# Patient Record
Sex: MALE | Race: BLACK OR AFRICAN AMERICAN | NOT HISPANIC OR LATINO | URBAN - METROPOLITAN AREA
[De-identification: names, ages, dates, MRNs, and addresses within clinical notes are randomized per-mention and may not be internally consistent; named-entity substitution may affect disease eponyms.]

---

## 2021-09-07 ENCOUNTER — HOSPITAL ENCOUNTER (EMERGENCY)
Facility: HOSPITAL | Age: 46
Discharge: HOME | End: 2021-09-07
Attending: EMERGENCY MEDICINE
Payer: MEDICAID

## 2021-09-07 VITALS
DIASTOLIC BLOOD PRESSURE: 83 MMHG | HEIGHT: 68 IN | WEIGHT: 290 LBS | TEMPERATURE: 100 F | BODY MASS INDEX: 43.95 KG/M2 | OXYGEN SATURATION: 96 % | SYSTOLIC BLOOD PRESSURE: 152 MMHG | RESPIRATION RATE: 18 BRPM | HEART RATE: 81 BPM

## 2021-09-07 DIAGNOSIS — U07.1 COVID-19 VIRUS INFECTION: Primary | ICD-10-CM

## 2021-09-07 DIAGNOSIS — J40 BRONCHITIS: ICD-10-CM

## 2021-09-07 DIAGNOSIS — U07.1 COVID-19 VIRUS DETECTED: ICD-10-CM

## 2021-09-07 DIAGNOSIS — R05.9 COUGH: ICD-10-CM

## 2021-09-07 LAB
CTP QC/QA: YES
SARS-COV-2 RDRP RESP QL NAA+PROBE: POSITIVE

## 2021-09-07 PROCEDURE — 99283 PR EMERGENCY DEPT VISIT,LEVEL III: ICD-10-PCS | Mod: CR,CS,, | Performed by: EMERGENCY MEDICINE

## 2021-09-07 PROCEDURE — 99283 EMERGENCY DEPT VISIT LOW MDM: CPT | Mod: 25

## 2021-09-07 PROCEDURE — U0002 COVID-19 LAB TEST NON-CDC: HCPCS | Performed by: EMERGENCY MEDICINE

## 2021-09-07 PROCEDURE — 99283 EMERGENCY DEPT VISIT LOW MDM: CPT | Mod: CR,CS,, | Performed by: EMERGENCY MEDICINE

## 2021-09-07 RX ORDER — AZITHROMYCIN 250 MG/1
250 TABLET, FILM COATED ORAL DAILY
Qty: 6 TABLET | Refills: 0 | Status: SHIPPED | OUTPATIENT
Start: 2021-09-07

## 2023-06-29 DIAGNOSIS — M65.9 SYNOVITIS AND TENOSYNOVITIS, UNSPECIFIED: Primary | ICD-10-CM

## 2023-07-28 ENCOUNTER — CLINICAL SUPPORT (OUTPATIENT)
Dept: REHABILITATION | Facility: HOSPITAL | Age: 48
End: 2023-07-28
Payer: MEDICAID

## 2023-07-28 DIAGNOSIS — R29.898 DECREASED GRIP STRENGTH OF RIGHT HAND: ICD-10-CM

## 2023-07-28 DIAGNOSIS — M25.60 RANGE OF MOTION DEFICIT: ICD-10-CM

## 2023-07-28 PROCEDURE — 97530 THERAPEUTIC ACTIVITIES: CPT | Mod: PO

## 2023-07-28 PROCEDURE — 97165 OT EVAL LOW COMPLEX 30 MIN: CPT | Mod: PO

## 2023-07-28 NOTE — PATIENT INSTRUCTIONS
"[facility] - OCCUPATIONAL THERAPY  HOME EXERCISE PROGRAM     Complete the following exercises with 10 repetitions each, 4 x/day.     AROM: DIP Flexion / Extension  Pinch middle knuckle to prevent bending. Bend end knuckle until stretch is felt.   Hold 3 seconds. Relax. Straighten finger as far as possible.    AROM: PIP Flexion / Extension  Pinch bottom knuckle  to prevent bending. Actively bend middle knuckle until stretch is felt.   Hold 3 seconds. Relax. Straighten finger as far as possible.      AROM: Isolated MCP Flexion / Extension ("Wave")   Bend only your large, bottom knuckles. Hold 3 seconds. Keep the tips of your fingers straight. Straighten fingers.    AROM: Isolated IPJ Flexion / Extension ("Hook")  Bend only your middle and end knuckles. Hold 3 seconds.   Straighten your fingers.     AROM: MCP and PIP Flexion / Extension ("Straight Fist")  Bend your bottom and middle knuckles, keeping the tips of your fingers straight. Try to touch the pads of your fingers on your palm. Hold 3 seconds. Straighten your fingers.     AROM: Composite Flexion / Extension ("Full Fist")  Bend every joint in your hand into a fist. Hold 3 seconds. Straighten your fingers.       AROM: Abduction / Adduction  With hand flat on table, spread all fingers apart, then bring them together as close as possible.    Copyright © I. All rights reserved.     Therapist: ERNESTO Figueroa/CALI      [facility], OCCUPATIONAL THERAPY  HOME EXERCISE PROGRAM     Complete the following stretches holding for 10 seconds per stretch. Complete 5 of each stretch, 4x/day.     Hook Stretch  Use other hand to bend middle and tip joints of your finger.         Composite Flexion Stretch  Use other hand to bend your finger at all three joints.           "

## 2023-07-28 NOTE — PROGRESS NOTES
[facility]: Occupational Therapy Note     See plan of care for full initial OT evaluation.    Amy Stuart, JORDEN, OTR/L     normal...

## 2023-07-31 PROBLEM — R29.898 DECREASED GRIP STRENGTH OF RIGHT HAND: Status: ACTIVE | Noted: 2023-07-31

## 2023-07-31 PROBLEM — M25.60 RANGE OF MOTION DEFICIT: Status: ACTIVE | Noted: 2023-07-31

## 2023-08-01 NOTE — PLAN OF CARE
[facility]  Occupational Therapy Initial Evaluation    Date: 7/28/2023  Name: Morales Lee  Clinic Number: [id number]    Therapy Diagnosis:   Encounter Diagnoses   Name Primary?    Range of motion deficit     Decreased  strength of right hand      Physician: Pito Martinez MD    Physician Orders: OT Eval and Treat  Medical Diagnosis: M65.9 (ICD-10-CM) - Synovitis and tenosynovitis, unspecified  Surgical Procedure and Date: N/A / Date of Injury/Onset: July 2022   Evaluation Date: 7/28/2023  Insurance Authorization Period Expiration: 08/28/2023  Plan of Care Expiration: 10/20/23 (12 weeks)  Date of Return to MD: 4 weeks  Visit # / Visits authorized: 1 / 1  FOTO: To be assessed next session    Precautions:  Standard    Time In: 8:15 am  Time Out: 8:55 am  Total Appointment Time (timed & untimed codes): 40 minutes    SUBJECTIVE     Date of Onset: July 2022    History of Current Condition/Mechanism of Injury: Morales reports: he fell on concrete 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 Stiffness worse in small and long finger. Right small finger was broken but healed on it's own by the time he was able to see the doctor. Ring finger stiff also and he has difficulty making a fist.    Falls: none     Involved Side: Right  Dominant Side: Right  Imaging: Reviewed   Prior Therapy: none  Occupation: Cuts grass  Working presently: self-employed  Duties: using machinery     Functional Limitations/Social History:    Previous functional status includes: Independent with all ADLs and IADLs.     Current Functional Status   Home/Living environment: lives alone      Limitation of Functional Status as follows:   ADLs/IADLs:     - Feeding: Mod I     - Bathing: Mod I    - Dressing/Grooming: Mod I    - Driving: Mod I      Leisure: none noted    Pain:  Functional Pain Scale Rating 0-10: Current 8/10, worst 10/10, best 8/10   Location: R digits 3-5  Description: Sharp  Aggravating Factors: impact, pressure   Easing  Factors: pain medication and rest     Patient's Goals for Therapy: to get hand back     Medical History:   No past medical history on file.    Surgical History:    has no past surgical history on file.    Medications:   has a current medication list which includes the following prescription(s): azithromycin.    Allergies:   Review of patient's allergies indicates:  No Known Allergies     OBJECTIVE     Observation/Appearance: Skin warm and dry.     Edema. Measured in centimeters.   7/28/2023 7/28/2023    Left Right   Long:       P1 7.4 7.6    PIP 7.4 8.0   P2 6.6 6.9     Elbow and Wrist ROM. WFL - all planes of motion.     Hand ROM. Measured in degrees.   7/28/2023    Right       Index:  WFL       Long:  MP 0/70              PIP 0/80              DIP 0/50              UMANZOR 200       Ring:   MP 0/70              PIP 0/70              DIP 0/58              UMANZOR 198       Small:  MP 0/75               PIP -30/59               DIP 0/35              UMANZOR 139       Thumb:            Opposition WFL      Strength (Dynamometer) and Pinch Strength (Pinch Gauge)  Measured in pounds to POP.   7/28/2023 7/28/2023    Left Right   Rung II 83 61   3pt Pinch 19.5 16     Sensation. Grossly intact per report. Pt denies numbness and/or tingling in RUE(s).     Limitation/Restriction for FOTO Hand Survey    To be assessed next session.    Limitation Score: N/A       Treatment   Total Treatment time (time-based codes) separate from Evaluation: 18 minutes    Morales received the treatments listed below:     Supervised modalities after being cleared for contradictions: Fluidotherapy - 115 degrees and 50 speed, to R hand for 8 minutes to increase blood flow and circulation, desensitization and sensory re-education, pain management, and increased tissue extensibility prior to therex. Pt instructed on performing active range of motion exercises while receiving heat to increase active motion.     Therapeutic activities to improve functional  performance for 10 minutes, including:  - DIP/PIP joint blocking  - TGEs  - Finger add/abd  - Digit PROM to tolerance: hook and composite fist    Patient Education and Home Exercises      Education provided:   - Role of OT and POC  - HEP     Written Home Exercises Provided: yes.  Exercises were reviewed and Morales was able to demonstrate them prior to the end of the session.  Morales demonstrated good  understanding of the education provided. See EMR under Patient Instructions for exercises provided during therapy sessions.     Pt was advised to perform these exercises free of pain, and to stop performing them if pain occurs.    Patient/Family Education: role of OT, goals for OT, scheduling/cancellations - pt verbalized understanding. Discussed insurance limitations with patient.    ASSESSMENT     Morales Lee is a 47 y.o. male referred to outpatient occupational therapy and presents with a medical diagnosis of Synovitis and tenosynovitis.  Patient presents with the following therapy deficits: Decreased ROM, Decreased  strength, Decreased pinch strength, Decreased muscle strength, Decreased functional hand use, Increased pain, Edema, Joint Stiffness, and Diminished/Impaired Coordination and demonstrates limitations as described in the chart below. Following medical record review it is determined that pt will benefit from occupational therapy services in order to maximize pain free and/or functional use of right hand. The following goals were discussed with the patient and patient is in agreement with them as to be addressed in the treatment plan. The patient's rehab potential is Good.     Anticipated barriers to occupational therapy: delay in treatment  Pt has no cultural, educational or language barriers to learning provided.    Profile and History Assessment of Occupational Performance Level of Clinical Decision Making Complexity Score   Occupational Profile:   Morales Lee is a 47 y.o. male who lives  alone and is currently employed Morales Lee has difficulty with  ADLs and IADLs as listed previously, which  Affecting hisdaily functional abilities.      Comorbidities:    has no past medical history on file.    Medical and Therapy History Review:   Expanded               Performance Deficits    Physical:  Joint Mobility  Joint Stability  Muscle Power/Strength  Muscle Endurance  Edema   Strength  Pinch Strength  Fine Motor Coordination  Pain    Cognitive:  No Deficits    Psychosocial:    Habits  Routines  Rituals     Clinical Decision Making:  low    Assessment Process:  Detailed Assessments    Modification/Need for Assistance:  Minimal-Moderate Modifications/Assistance    Intervention Selection:  Limited Treatment Options       low  Based on PMHX, co morbidities , data from assessments and functional level of assistance required with task and clinical presentation directly impacting function.         Goals:   The following goals were discussed with the patient and patient is in agreement with them as to be addressed in the treatment plan.   Long Term Goals (LTGs); to be met by discharge.  LTG #1: Pt will report a pain level of 1-3 out of 10 at worst with gripping.   LTG #2: FOTO to be administered and assessed next session with updates to goals as needed.  LTG #3: Pt will return to prior level of function for IADLs, driving, and household management.     Short Term Goals (STGs); to be met within 4 weeks (8/25/23).  STG #1: Pt will report a pain level of 4-5 out of 10 at worst with gripping.  STG #2: Pt will report/demo [place] with ADLs using his right hand.  STG #3: Pt will demonstrate independence with issued HEP and modalities for pain/symptom management.  STG #4: Pt will demo WFL digit ROM needed to aid with grasping tools.  STG #5: Decrease edema of R LF by 0.2-0.5 cm to increase joint mobility/flexibility for hand/arm use.  STG #6: Pt will increase right hand  strength by at least 3-5 lbs  needed to perform work tasks.   STG #7: Pt will increase pinch strength by at least 1-3 psi, right hand needed to open packages.     PLAN   Plan of Care Certification: 7/28/2023 to 10/20/23 (12 weeks).     Outpatient Occupational Therapy 2-3 times weekly for 12 weeks to include the following interventions: Paraffin, Fluidotherapy, Manual therapy/joint mobilizations, Modalities for pain management, US 3 mhz, Therapeutic exercises/activities., Iontophoresis with 2.0 cc Dexamethasone, Strengthening, Orthotic Fabrication/Fit/Training, Edema Control, Electrical Modalities, Joint Protection, and Energy Conservation.      Amy Stuart, OTR/L      I CERTIFY THE NEED FOR THESE SERVICES FURNISHED UNDER THIS PLAN OF TREATMENT AND WHILE UNDER MY CARE  Physician's comments:      Physician's Signature: ___________________________________________________

## 2023-08-03 ENCOUNTER — CLINICAL SUPPORT (OUTPATIENT)
Dept: REHABILITATION | Facility: HOSPITAL | Age: 48
End: 2023-08-03
Payer: MEDICAID

## 2023-08-03 DIAGNOSIS — M25.60 RANGE OF MOTION DEFICIT: Primary | ICD-10-CM

## 2023-08-03 DIAGNOSIS — R29.898 DECREASED GRIP STRENGTH OF RIGHT HAND: ICD-10-CM

## 2023-08-03 PROCEDURE — 97530 THERAPEUTIC ACTIVITIES: CPT

## 2023-08-03 NOTE — PROGRESS NOTES
[facility]  Occupational Therapy Treatment Note    Date: 8/3/2023  Name: Morales Lee  Clinic Number: [id number]    Therapy Diagnosis:   Encounter Diagnoses   Name Primary?    Range of motion deficit Yes    Decreased  strength of right hand      Physician: Pito Martinez MD    Physician Orders: OT Eval and Treat  Medical Diagnosis: M65.9 (ICD-10-CM) - Synovitis and tenosynovitis, unspecified  Surgical Procedure and Date: N/A / Date of Injury/Onset: July 2022   Evaluation Date: 7/28/2023  Insurance Authorization Period Expiration: 08/28/2023  Plan of Care Expiration: 10/20/23 (12 weeks)  Date of Return to MD: end of August   Visit # / Visits authorized: 2 / 21  FOTO: 1/3    Precautions: Standard    Time In: 9:45 am  Time Out: 10:25 am  Total Billable Time: 40 minutes    SUBJECTIVE     Pt reports: doing better but fingers still stiff.  He was compliant with home exercise program given last session.   Response to previous treatment: First treatment  Functional change: none noted     Pain: 3-4/10  Location: right hand    OBJECTIVE   Objective Measures updated at progress report unless specified.    Observation/Appearance: Skin warm and dry.      Edema. Measured in centimeters.    7/28/2023 7/28/2023     Left Right   Long:         P1 7.4 7.6    PIP 7.4 8.0   P2 6.6 6.9      Elbow and Wrist ROM. WFL - all planes of motion.      Hand ROM. Measured in degrees.    7/28/2023     Right         Index:  WFL         Long:  MP 0/70              PIP 0/80              DIP 0/50              UMANZOR 200         Ring:   MP 0/70              PIP 0/70              DIP 0/58              UMANZOR 198         Small:  MP 0/75               PIP -30/59               DIP 0/35              UMANZOR 139         Thumb:             Opposition WFL       Strength (Dynamometer) and Pinch Strength (Pinch Gauge)  Measured in pounds to POP.    7/28/2023 7/28/2023     Left Right   Rung II 83 61   3pt Pinch 19.5 16       Sensation. Grossly intact per report. Pt denies numbness and/or tingling in RUE(s).       Treatment     Morales received the treatments listed below:     Supervised modalities after being cleared for contradictions: Paraffin bath - with moist heat pack to R hand(s) for 10 minutes in fist stretch to increase blood flow, circulation, pain management, and for tissue elasticity prior to therex.     Therapeutic activities to improve functional performance for 30 minutes, including:  - DIP/PIP joint blocking x 10 reps each finger  - TGEs x 15 reps  - Finger add/abd x 10 reps  - Digit PROM to tolerance: hook and composite fist x 10 min  - Blue sponge hook to fist squeezes x 2 min     Patient Education and Home Exercises      Education provided:   - HEP in place, added blue sponge hook to fist squeezes x 2 min, 1-2 x daily.   - Progress towards goals     Written Home Exercises Provided: Patient instructed to cont prior HEP with additions.  Exercises were reviewed and Morales was able to demonstrate them prior to the end of the session.  Morales demonstrated good  understanding of the HEP provided. See EMR under Patient Instructions for exercises provided during therapy sessions.      ASSESSMENT     Pt returns for his first follow-up visit this date and endorses good adherence to HEP. He participated well in clinic and tolerated all treatment well. R digits 3-5 continue to be stiff and limited with comp flexion but improved by end of session to a near full fist. Will continue to progress as tolerated.      Morales is progressing well towards his goals and there are no updates to goals at this time. Pt prognosis is Good.     Pt will continue to benefit from skilled outpatient occupational therapy to address the deficits listed in the problem list on initial evaluation, provide pt/family education and to maximize pt's level of independence in the home and community environment.     Pt's spiritual, cultural and educational  needs considered and pt agreeable to plan of care and goals.    Anticipated barriers to occupational therapy: delay in treatment    Goals:   The following goals were discussed with the patient and patient is in agreement with them as to be addressed in the treatment plan.   Long Term Goals (LTGs); to be met by discharge.  LTG #1: Pt will report a pain level of 1-3 out of 10 at worst with gripping. progressing not met 8/3/2023  LTG #2: FOTO to be administered and assessed next session with updates to goals as needed. progressing not met 8/3/2023  LTG #3: Pt will return to prior level of function for IADLs, driving, and household management. progressing not met 8/3/2023     Short Term Goals (STGs); to be met within 4 weeks (8/25/23).  STG #1: Pt will report a pain level of 4-5 out of 10 at worst with gripping. progressing not met 8/3/2023  STG #2: Pt will report/demo [place] with ADLs using his right hand. progressing not met 8/3/2023  STG #3: Pt will demonstrate independence with issued HEP and modalities for pain/symptom management. progressing not met 8/3/2023  STG #4: Pt will demo WFL digit ROM needed to aid with grasping tools. progressing not met 8/3/2023  STG #5: Decrease edema of R LF by 0.2-0.5 cm to increase joint mobility/flexibility for hand/arm use. progressing not met 8/3/2023  STG #6: Pt will increase right hand  strength by at least 3-5 lbs needed to perform work tasks. progressing not met 8/3/2023  STG #7: Pt will increase pinch strength by at least 1-3 psi, right hand needed to open packages. progressing not met 8/3/2023    PLAN     Continue skilled occupational therapy with individualized plan of care focusing on maximizing functional use of patient's right hand.    Updates/Grading for next session: progress as tolerated.     Amy Stuart, OTR/L

## 2023-08-10 ENCOUNTER — CLINICAL SUPPORT (OUTPATIENT)
Dept: REHABILITATION | Facility: HOSPITAL | Age: 48
End: 2023-08-10
Payer: MEDICAID

## 2023-08-10 DIAGNOSIS — M25.60 RANGE OF MOTION DEFICIT: Primary | ICD-10-CM

## 2023-08-10 DIAGNOSIS — R29.898 DECREASED GRIP STRENGTH OF RIGHT HAND: ICD-10-CM

## 2023-08-10 PROCEDURE — 97530 THERAPEUTIC ACTIVITIES: CPT

## 2023-08-10 NOTE — PROGRESS NOTES
"[facility]  Occupational Therapy Treatment Note    Date: 8/10/2023  Name: Morales Lee  Clinic Number: [id number]    Therapy Diagnosis:   Encounter Diagnoses   Name Primary?    Range of motion deficit Yes    Decreased  strength of right hand      Physician: Pito Martinez MD    Physician Orders: OT Eval and Treat  Medical Diagnosis: M65.9 (ICD-10-CM) - Synovitis and tenosynovitis, unspecified  Surgical Procedure and Date: N/A / Date of Injury/Onset: July 2022   Evaluation Date: 7/28/2023  Insurance Authorization Period Expiration: 08/28/2023  Plan of Care Expiration: 10/20/23 (12 weeks)  Date of Return to MD: 2 month f/u  Visit # / Visits authorized: 3 / 21  FOTO: 1/3    Precautions: Standard    Time In: 9:45 am  Time Out: 10:20 am  Total Billable Time: 35 minutes    SUBJECTIVE     Pt reports: "Getting better. The heat really helps."  He was compliant with home exercise program given last session.   Response to previous treatment: Good - improving motion  Functional change: none noted     Pain: 8/10  Location: right hand    OBJECTIVE   Objective Measures updated at progress report unless specified.    Observation/Appearance: Skin warm and dry.      Edema. Measured in centimeters.    7/28/2023 7/28/2023     Left Right   Long:         P1 7.4 7.6    PIP 7.4 8.0   P2 6.6 6.9      Elbow and Wrist ROM. WFL - all planes of motion.      Hand ROM. Measured in degrees.    7/28/2023     Right         Index:  WFL         Long:  MP 0/70              PIP 0/80              DIP 0/50              UMANZOR 200         Ring:   MP 0/70              PIP 0/70              DIP 0/58              UMANZOR 198         Small:  MP 0/75               PIP -30/59               DIP 0/35              UMANZOR 139         Thumb:             Opposition WFL       Strength (Dynamometer) and Pinch Strength (Pinch Gauge)  Measured in pounds to POP.    7/28/2023 7/28/2023     Left Right   Rung II 83 61   3pt Pinch 19.5 16    "   Sensation. Grossly intact per report. Pt denies numbness and/or tingling in RUE(s).       Treatment     Morales received the treatments listed below:     Supervised modalities after being cleared for contradictions: Paraffin bath - with moist heat pack to R hand(s) for 10 minutes in fist stretch to increase blood flow, circulation, pain management, and for tissue elasticity prior to therex.     Therapeutic activities to improve functional performance for 25 minutes, including:  - DIP/PIP joint blocking x 10 reps each finger  - TGEs x 15 reps   - Blue digi-flex, 2 x 10 reps with 3 sec holds   - Finger add/abd x 15 reps  - Digit PROM to tolerance: hook and composite fist x 7 min  - Marker hook to fist roll x 2 min     Patient Education and Home Exercises      Education provided:   - HEP in place  - Progress towards goals     Written Home Exercises Provided: Patient instructed to cont prior HEP.   Exercises were reviewed and Morales was able to demonstrate them prior to the end of the session.  Morales demonstrated good  understanding of the HEP provided. See EMR under Patient Instructions for exercises provided during therapy sessions.      ASSESSMENT     Pt cont to endorse good adherence to HEP. He participated well in clinic and tolerated all treatment well. R digits 3-5 continue to be stiff and limited with comp flexion but improved by end of session to a near full fist. Will continue to progress as tolerated.      Morales is progressing well towards his goals and there are no updates to goals at this time. Pt prognosis is Good.     Pt will continue to benefit from skilled outpatient occupational therapy to address the deficits listed in the problem list on initial evaluation, provide pt/family education and to maximize pt's level of independence in the home and community environment.     Pt's spiritual, cultural and educational needs considered and pt agreeable to plan of care and goals.    Anticipated barriers  to occupational therapy: delay in treatment    Goals:   The following goals were discussed with the patient and patient is in agreement with them as to be addressed in the treatment plan.   Long Term Goals (LTGs); to be met by discharge.  LTG #1: Pt will report a pain level of 1-3 out of 10 at worst with gripping. progressing not met 8/10/2023  LTG #2: FOTO to be administered and assessed next session with updates to goals as needed. progressing not met 8/10/2023  LTG #3: Pt will return to prior level of function for IADLs, driving, and household management. progressing not met 8/10/2023     Short Term Goals (STGs); to be met within 4 weeks (8/25/23).  STG #1: Pt will report a pain level of 4-5 out of 10 at worst with gripping. progressing not met 8/10/2023  STG #2: Pt will report/demo [place] with ADLs using his right hand. progressing not met 8/10/2023  STG #3: Pt will demonstrate independence with issued HEP and modalities for pain/symptom management. progressing not met 8/10/2023  STG #4: Pt will demo WFL digit ROM needed to aid with grasping tools. progressing not met 8/10/2023  STG #5: Decrease edema of R LF by 0.2-0.5 cm to increase joint mobility/flexibility for hand/arm use. progressing not met 8/10/2023  STG #6: Pt will increase right hand  strength by at least 3-5 lbs needed to perform work tasks. progressing not met 8/10/2023  STG #7: Pt will increase pinch strength by at least 1-3 psi, right hand needed to open packages. progressing not met 8/10/2023    PLAN     Continue skilled occupational therapy with individualized plan of care focusing on maximizing functional use of patient's right hand.    Updates/Grading for next session: progress as tolerated.     Amy Stuart, OTR/L

## 2023-08-15 ENCOUNTER — CLINICAL SUPPORT (OUTPATIENT)
Dept: REHABILITATION | Facility: HOSPITAL | Age: 48
End: 2023-08-15
Payer: MEDICAID

## 2023-08-15 DIAGNOSIS — M25.60 RANGE OF MOTION DEFICIT: Primary | ICD-10-CM

## 2023-08-15 DIAGNOSIS — R29.898 DECREASED GRIP STRENGTH OF RIGHT HAND: ICD-10-CM

## 2023-08-15 PROCEDURE — 97530 THERAPEUTIC ACTIVITIES: CPT

## 2023-08-15 NOTE — PROGRESS NOTES
"[facility]  Occupational Therapy Treatment Note    Date: 8/15/2023  Name: Morales Lee  Clinic Number: [id number]    Therapy Diagnosis:   Encounter Diagnoses   Name Primary?    Range of motion deficit Yes    Decreased  strength of right hand      Physician: Pito Martinez MD    Physician Orders: OT Eval and Treat  Medical Diagnosis: M65.9 (ICD-10-CM) - Synovitis and tenosynovitis, unspecified  Surgical Procedure and Date: N/A / Date of Injury/Onset: July 2022   Evaluation Date: 7/28/2023  Insurance Authorization Period Expiration: 08/28/2023  Plan of Care Expiration: 10/20/23 (12 weeks)  Date of Return to MD: 2 month f/u  Visit # / Visits authorized: 4 / 21  FOTO: 1/3    Precautions: Standard    Time In: 9:50 am  Time Out: 10:23 am  Total Billable Time: 33 minutes    SUBJECTIVE     Pt reports: "It's about the same. The heat really helps."  He was compliant with home exercise program given last session.   Response to previous treatment: Good - improving motion  Functional change: none noted     Pain: 8/10  Location: right hand    OBJECTIVE   Objective Measures updated at progress report unless specified.    Observation/Appearance: Skin warm and dry.      Edema. Measured in centimeters.    7/28/2023 7/28/2023     Left Right   Long:         P1 7.4 7.6    PIP 7.4 8.0   P2 6.6 6.9      Elbow and Wrist ROM. WFL - all planes of motion.      Hand ROM. Measured in degrees.    7/28/2023     Right         Index:  WFL         Long:  MP 0/70              PIP 0/80              DIP 0/50              UMANZOR 200         Ring:   MP 0/70              PIP 0/70              DIP 0/58              UMANZOR 198         Small:  MP 0/75               PIP -30/59               DIP 0/35              UMANZOR 139         Thumb:             Opposition WFL       Strength (Dynamometer) and Pinch Strength (Pinch Gauge)  Measured in pounds to POP.    7/28/2023 7/28/2023     Left Right   Rung II 83 61   3pt Pinch 19.5 16 "      Sensation. Grossly intact per report. Pt denies numbness and/or tingling in RUE(s).       Treatment     Morales received the treatments listed below:     Supervised modalities after being cleared for contradictions: Paraffin bath - with moist heat pack to R hand(s) for 10 minutes in fist stretch to increase blood flow, circulation, pain management, and for tissue elasticity prior to therex.     Therapeutic activities to improve functional performance for 23 minutes, including:  - DIP/PIP joint blocking x 10 reps each finger   - TGEs x 15 reps   - Blue digi-flex, 3 x 10 reps with 3 sec holds   - Finger add/abd x 15 reps  - Digit PROM to tolerance: hook and composite fist x 5 min  - Marker hook to fist roll x 2 min     Patient Education and Home Exercises      Education provided:   - HEP in place  - Purchase of flexion glove for home use  - Progress towards goals     Written Home Exercises Provided: Patient instructed to cont prior HEP.   Exercises were reviewed and Morales was able to demonstrate them prior to the end of the session.  Morales demonstrated good  understanding of the HEP provided. See EMR under Patient Instructions for exercises provided during therapy sessions.      ASSESSMENT     Pt cont to endorse good adherence to HEP. He participated well in clinic and tolerated all treatment well. R digits 3-5 continue to be stiff and limited with comp flexion but improved by end of session to a near full fist. Will continue to progress as tolerated.      Morales is progressing well towards his goals and there are no updates to goals at this time. Pt prognosis is Good.     Pt will continue to benefit from skilled outpatient occupational therapy to address the deficits listed in the problem list on initial evaluation, provide pt/family education and to maximize pt's level of independence in the home and community environment.     Pt's spiritual, cultural and educational needs considered and pt agreeable to  plan of care and goals.    Anticipated barriers to occupational therapy: delay in treatment    Goals:   The following goals were discussed with the patient and patient is in agreement with them as to be addressed in the treatment plan.   Long Term Goals (LTGs); to be met by discharge.  LTG #1: Pt will report a pain level of 1-3 out of 10 at worst with gripping. progressing not met 8/15/2023  LTG #2: FOTO to be administered and assessed next session with updates to goals as needed. progressing not met 8/15/2023  LTG #3: Pt will return to prior level of function for IADLs, driving, and household management. progressing not met 8/15/2023     Short Term Goals (STGs); to be met within 4 weeks (8/25/23).  STG #1: Pt will report a pain level of 4-5 out of 10 at worst with gripping. progressing not met 8/15/2023  STG #2: Pt will report/demo [place] with ADLs using his right hand. progressing not met 8/15/2023  STG #3: Pt will demonstrate independence with issued HEP and modalities for pain/symptom management. progressing not met 8/15/2023  STG #4: Pt will demo WFL digit ROM needed to aid with grasping tools. progressing not met 8/15/2023  STG #5: Decrease edema of R LF by 0.2-0.5 cm to increase joint mobility/flexibility for hand/arm use. progressing not met 8/15/2023  STG #6: Pt will increase right hand  strength by at least 3-5 lbs needed to perform work tasks. progressing not met 8/15/2023  STG #7: Pt will increase pinch strength by at least 1-3 psi, right hand needed to open packages. progressing not met 8/15/2023    PLAN     Continue skilled occupational therapy with individualized plan of care focusing on maximizing functional use of patient's right hand.    Updates/Grading for next session: progress as tolerated.     Amy Stuart, OTR/L

## 2023-09-19 ENCOUNTER — CLINICAL SUPPORT (OUTPATIENT)
Dept: REHABILITATION | Facility: HOSPITAL | Age: 48
End: 2023-09-19
Payer: MEDICAID

## 2023-09-19 DIAGNOSIS — R29.898 DECREASED GRIP STRENGTH OF RIGHT HAND: ICD-10-CM

## 2023-09-19 DIAGNOSIS — M25.60 RANGE OF MOTION DEFICIT: Primary | ICD-10-CM

## 2023-09-19 PROCEDURE — 97530 THERAPEUTIC ACTIVITIES: CPT

## 2023-09-19 NOTE — PROGRESS NOTES
"  [facility]  Occupational Therapy Treatment Note    Date: 9/19/2023  Name: Morales Lee  Clinic Number: [id number]    Therapy Diagnosis:   Encounter Diagnoses   Name Primary?    Range of motion deficit Yes    Decreased  strength of right hand      Physician: Pito Martinez MD    Physician Orders: OT Eval and Treat  Medical Diagnosis: M65.9 (ICD-10-CM) - Synovitis and tenosynovitis, unspecified  Surgical Procedure and Date: N/A / Date of Injury/Onset: July 2022   Evaluation Date: 7/28/2023  Insurance Authorization Period Expiration: 08/28/2023  Plan of Care Expiration: 10/20/23 (12 weeks)  Date of Return to MD: 2 month f/u  Visit # / Visits authorized: 5 / 21  FOTO: 2/3    Precautions: Standard    Time In: 9:00 am  Time Out: 9:45 am  Total Billable Time: 45 minutes    SUBJECTIVE     Pt reports: "It only really hurts when I hit it or stretch it."   He was compliant with home exercise program given last session.   Response to previous treatment: Good - improving motion  Functional change: able to use weed eater with hand    Pain: 7/10  Location: right hand    OBJECTIVE   Objective Measures updated at progress report unless specified.    Observation/Appearance: Skin warm and dry.      Edema. Measured in centimeters.    7/28/2023 7/28/2023     Left Right   Long:         P1 7.4 7.6    PIP 7.4 8.0   P2 6.6 6.9      Elbow and Wrist ROM. WFL - all planes of motion.      Hand ROM. Measured in degrees.    7/28/2023 9/19/2023     Right Right          Index:  WFL           Long:  MP 0/70 0/80              PIP 0/80 0/80              DIP 0/50 0/60              UMANZOR 200 220 (+20)          Ring:   MP 0/70 0/85              PIP 0/70 0/80              DIP 0/58 0/60              UMANZOR 198 225 (+27)          Small:  MP 0/75 0/85               PIP -30/59 -28/70               DIP 0/35 0/40              UMANZOR 139 167 (+28)          Thumb:              Opposition WFL        Strength (Dynamometer) and " Pinch Strength (Pinch Gauge)  Measured in pounds to POP.    7/28/2023 7/28/2023 9/19/2023     Left Right Right   Rung II 83 61 78   3pt Pinch 19.5 16 19      Sensation. Grossly intact per report. Pt denies numbness and/or tingling in RUE(s).       Treatment     Morales received the treatments listed below:     Supervised modalities after being cleared for contradictions: 115 degrees and 50 speed, to R hand/wrist for 10 minutes to increase blood flow and circulation, desensitization and sensory re-education, pain management, and increased tissue extensibility prior to therex. Pt instructed on performing active range of motion exercises while receiving heat to increase active motion.     Therapeutic activities to improve functional performance for 35 minutes, including:  - Updated objective measurements, see above.   - Composite fist with wrist flexion for extrinsic stretch x 20 reps   - TGEs x 15 reps   - Digit PROM to tolerance: hook and composite fist x 3 min   - Marker hook to fist roll in coban wrap x 3 min   - Hook  to full fist with yellow putty x 3 min     Patient Education and Home Exercises      Education provided:   - HEP in place, issued yellow putty   - Progress towards goals     Written Home Exercises Provided: Patient instructed to cont prior HEP.   Exercises were reviewed and Morales was able to demonstrate them prior to the end of the session.  Morales demonstrated good  understanding of the HEP provided. See EMR under Patient Instructions for exercises provided during therapy sessions.      ASSESSMENT     Pt cont to endorse good adherence to HEP. He participated well in clinic and tolerated all treatment well. Able to make a near full fist upon arrival with good improvements in R digits 3-5 ROM per formal assessment. Good improvements in strength as well. Digits continue to be minimally stiff and moderately painful. Will continue to progress as tolerated.      Morales is progressing well towards  his goals and there are no updates to goals at this time. Pt prognosis is Good.     Pt will continue to benefit from skilled outpatient occupational therapy to address the deficits listed in the problem list on initial evaluation, provide pt/family education and to maximize pt's level of independence in the home and community environment.     Pt's spiritual, cultural and educational needs considered and pt agreeable to plan of care and goals.    Anticipated barriers to occupational therapy: delay in treatment    Goals:   The following goals were discussed with the patient and patient is in agreement with them as to be addressed in the treatment plan.   Long Term Goals (LTGs); to be met by discharge.  LTG #1: Pt will report a pain level of 1-3 out of 10 at worst with gripping. progressing not met 9/19/2023  LTG #2: FOTO to be administered and assessed next session with updates to goals as needed. progressing not met 9/19/2023  LTG #3: Pt will return to prior level of function for IADLs, driving, and household management. Met 9/19/2023     Short Term Goals (STGs); to be met within 4 weeks (8/25/23).  STG #1: Pt will report a pain level of 4-5 out of 10 at worst with gripping. Met 9/19/2023  STG #2: Pt will report/demo [place] with ADLs using his right hand. Met 9/19/2023  STG #3: Pt will demonstrate independence with issued HEP and modalities for pain/symptom management. Met, ongoing 9/19/2023  STG #4: Pt will demo WFL digit ROM needed to aid with grasping tools. Met 9/19/2023  STG #5: Decrease edema of R LF by 0.2-0.5 cm to increase joint mobility/flexibility for hand/arm use. progressing not met 9/19/2023  STG #6: Pt will increase right hand  strength by at least 3-5 lbs needed to perform work tasks. Met 9/19/2023  STG #7: Pt will increase pinch strength by at least 1-3 psi, right hand needed to open packages. Met 9/19/2023    PLAN     Continue skilled occupational therapy with individualized plan of care  focusing on maximizing functional use of patient's right hand.    Updates/Grading for next session: progress as tolerated.     Amy Stuart, OTR/L

## 2023-09-26 ENCOUNTER — CLINICAL SUPPORT (OUTPATIENT)
Dept: REHABILITATION | Facility: HOSPITAL | Age: 48
End: 2023-09-26
Payer: MEDICAID

## 2023-09-26 DIAGNOSIS — M25.60 RANGE OF MOTION DEFICIT: Primary | ICD-10-CM

## 2023-09-26 DIAGNOSIS — R29.898 DECREASED GRIP STRENGTH OF RIGHT HAND: ICD-10-CM

## 2023-09-26 PROCEDURE — 97530 THERAPEUTIC ACTIVITIES: CPT

## 2023-09-26 NOTE — PROGRESS NOTES
"[facility]  Occupational Therapy Treatment Note    Date: 9/26/2023  Name: Morales Lee  Clinic Number: [id number]    Therapy Diagnosis:   Encounter Diagnoses   Name Primary?    Range of motion deficit Yes    Decreased  strength of right hand      Physician: Pito Martinez MD    Physician Orders: OT Eval and Treat  Medical Diagnosis: M65.9 (ICD-10-CM) - Synovitis and tenosynovitis, unspecified  Surgical Procedure and Date: N/A / Date of Injury/Onset: July 2022   Evaluation Date: 7/28/2023  Insurance Authorization Period Expiration: 08/28/2023  Plan of Care Expiration: 10/20/23 (12 weeks)  Date of Return to MD: October   Visit # / Visits authorized: 6 / 21  FOTO: 2/3    Precautions: Standard    Time In: 9:05 am  Time Out: 9:49 am  Total Billable Time: 44 minutes    SUBJECTIVE     Pt reports: "It hasn't gotten worse."   He was compliant with home exercise program given last session.   Response to previous treatment: Good - improving motion  Functional change: able to use weed eater with hand    Pain: 7/10 "aching pain"   Location: right hand    OBJECTIVE   Objective Measures updated at progress report unless specified.    Observation/Appearance: Skin warm and dry.      Edema. Measured in centimeters.    7/28/2023 7/28/2023     Left Right   Long:         P1 7.4 7.6    PIP 7.4 8.0   P2 6.6 6.9      Elbow and Wrist ROM. WFL - all planes of motion.      Hand ROM. Measured in degrees.    7/28/2023 9/19/2023     Right Right          Index:  WFL           Long:  MP 0/70 0/80              PIP 0/80 0/80              DIP 0/50 0/60              UMANZOR 200 220 (+20)          Ring:   MP 0/70 0/85              PIP 0/70 0/80              DIP 0/58 0/60              UMANZOR 198 225 (+27)          Small:  MP 0/75 0/85               PIP -30/59 -28/70               DIP 0/35 0/40              UMANZOR 139 167 (+28)          Thumb:              Opposition WFL        Strength (Dynamometer) and Pinch Strength " (Pinch Gauge)  Measured in pounds to POP.    7/28/2023 7/28/2023 9/19/2023     Left Right Right   Rung II 83 61 78   3pt Pinch 19.5 16 19      Sensation. Grossly intact per report. Pt denies numbness and/or tingling in RUE(s).       Treatment     Morales received the treatments listed below:     Supervised modalities after being cleared for contradictions: 115 degrees and 50 speed, to R hand/wrist for 10 minutes to increase blood flow and circulation, desensitization and sensory re-education, pain management, and increased tissue extensibility prior to therex. Pt instructed on performing active range of motion exercises while receiving heat to increase active motion.     Therapeutic activities to improve functional performance for 34 minutes, including:  - TGEs x 15 reps   - Digit PROM to tolerance: hook and composite fist x 3 min   - Marker hook to fist roll x 2 min   - Red putty cone gripping x 3 min for intrinsic strengthening  - Fabricated and applied custom thermoplastic MP blocking orthosis to facilitate hook fist for intrinsic stretch with flexion glove for static progressive component. Pt educated on purpose, donning/doffing, wear, care, and precautions.    Patient Education and Home Exercises      Education provided:   - HEP in place  - Static progressive orthosis wear   - Progress towards goals     Written Home Exercises Provided: Patient instructed to cont prior HEP.   Exercises were reviewed and Morales was able to demonstrate them prior to the end of the session.  Morales demonstrated good  understanding of the HEP provided. See EMR under Patient Instructions for exercises provided during therapy sessions.      ASSESSMENT     Pt cont to endorse good adherence to HEP. He participated well in clinic and tolerated all treatment well. Able to make a near full fist upon arrival with good improvements in R digits 3-5 ROM per observation. Digits continue to be minimally intrinsically tight and moderately  painful. Static progressive orthosis fabricated today for intrinsic stretching appeared to fit well with no c/o discomfort from the pt at this time. Will continue to progress as tolerated.      Morales is progressing well towards his goals and there are no updates to goals at this time. Pt prognosis is Good.     Pt will continue to benefit from skilled outpatient occupational therapy to address the deficits listed in the problem list on initial evaluation, provide pt/family education and to maximize pt's level of independence in the home and community environment.     Pt's spiritual, cultural and educational needs considered and pt agreeable to plan of care and goals.    Anticipated barriers to occupational therapy: delay in treatment    Goals:   The following goals were discussed with the patient and patient is in agreement with them as to be addressed in the treatment plan.   Long Term Goals (LTGs); to be met by discharge.  LTG #1: Pt will report a pain level of 1-3 out of 10 at worst with gripping. progressing not met 9/26/2023  LTG #2: FOTO to be administered and assessed next session with updates to goals as needed. progressing not met 9/26/2023  LTG #3: Pt will return to prior level of function for IADLs, driving, and household management. Met 9/19/2023     Short Term Goals (STGs); to be met within 4 weeks (8/25/23).  STG #1: Pt will report a pain level of 4-5 out of 10 at worst with gripping. Met 9/19/2023  STG #2: Pt will report/demo [place] with ADLs using his right hand. Met 9/19/2023  STG #3: Pt will demonstrate independence with issued HEP and modalities for pain/symptom management. Met, ongoing 9/26/2023  STG #4: Pt will demo WFL digit ROM needed to aid with grasping tools. Met 9/19/2023  STG #5: Decrease edema of R LF by 0.2-0.5 cm to increase joint mobility/flexibility for hand/arm use. progressing not met 9/26/2023  STG #6: Pt will increase right hand  strength by at least 3-5 lbs needed to  perform work tasks. Met 9/19/2023  STG #7: Pt will increase pinch strength by at least 1-3 psi, right hand needed to open packages. Met 9/19/2023    PLAN     Continue skilled occupational therapy with individualized plan of care focusing on maximizing functional use of patient's right hand.    Updates/Grading for next session: progress as tolerated. Orthosis adjustments PRN. Issue FOTO.    Amy Stuart, OTR/L